# Patient Record
Sex: FEMALE | Race: BLACK OR AFRICAN AMERICAN | Employment: FULL TIME | URBAN - METROPOLITAN AREA
[De-identification: names, ages, dates, MRNs, and addresses within clinical notes are randomized per-mention and may not be internally consistent; named-entity substitution may affect disease eponyms.]

---

## 2017-02-08 ENCOUNTER — OFFICE VISIT (OUTPATIENT)
Dept: URGENT CARE | Facility: URGENT CARE | Age: 47
End: 2017-02-08
Payer: COMMERCIAL

## 2017-02-08 VITALS
BODY MASS INDEX: 35.55 KG/M2 | OXYGEN SATURATION: 98 % | SYSTOLIC BLOOD PRESSURE: 110 MMHG | DIASTOLIC BLOOD PRESSURE: 78 MMHG | WEIGHT: 227 LBS | TEMPERATURE: 97.7 F | HEART RATE: 78 BPM

## 2017-02-08 DIAGNOSIS — M76.62 ACHILLES TENDINITIS OF LEFT LOWER EXTREMITY: Primary | ICD-10-CM

## 2017-02-08 PROCEDURE — 99213 OFFICE O/P EST LOW 20 MIN: CPT | Performed by: FAMILY MEDICINE

## 2017-02-08 RX ORDER — WARFARIN SODIUM 5 MG/1
TABLET ORAL
COMMUNITY
Start: 2017-02-04

## 2017-02-08 ASSESSMENT — PAIN SCALES - GENERAL: PAINLEVEL: SEVERE PAIN (6)

## 2017-02-08 NOTE — MR AVS SNAPSHOT
After Visit Summary   2/8/2017    Josy Shafer    MRN: [id number]           Patient Information     Date Of Birth          1970        Visit Information        Provider Department      2/8/2017 5:40 PM Pito Conway MD [facility]        Care Instructions      Treating Tendonitis of the Foot  Your healthcare provider's first concern is to reduce your symptoms. Using ice and heat, taking medicines, and limiting activity help control pain and swelling. Follow all of your healthcare provider's instructions. Returning to activity too soon may cause your symptoms to come back.    Ice and heat  Ice helps prevent swelling and reduce pain. Place ice on the painful area for 10 to 15 minutes. Repeat the icing several times a day. If ?you have had the problem for a while, using heat may help. Apply a heating pad or hot towels to the tendon for 20 to 30 minutes 2 or 3 times a day.  Medicines  Your healthcare provider may tell you to take ibuprofen or other anti-inflammatory medicines. These reduce pain and swelling. Take them as directed. Don t wait until you feel pain. In more severe cases, cortisone may be injected to relieve pain.  Limiting activities  Rest allows the tissues in your foot to heal. Stay off your feet for a few days, then slowly work back into activity. If you do high-impact activities, such as running or aerobics, try other activities that place less strain on your foot. Cycling and swimming are good choices.    6558-3634 [employer]. [address] [place] [address]. All rights reserved. This information is not intended as a substitute for professional medical care. Always follow your healthcare professional's instructions.              Follow-ups after your visit        Your next 10 appointments already scheduled     Feb 21, 2017 10:40 AM   New Visit with Kamar Genao OD   [facility]  "   [address] [place] [id number] [phone]              Who to contact     If you have questions or need follow up information about today's clinic visit or your schedule please contact [facility] directly at [phone].  Normal or non-critical lab and imaging results will be communicated to you by MyChart, letter or phone within 4 business days after the clinic has received the results. If you do not hear from us within 7 days, please contact the clinic through MyChart or phone. If you have a critical or abnormal lab result, we will notify you by phone as soon as possible.  Submit refill requests through [employer] or call your pharmacy and they will forward the refill request to us. Please allow 3 business days for your refill to be completed.          Additional Information About Your Visit        MyChart Information     [employer] lets you send messages to your doctor, view your test results, renew your prescriptions, schedule appointments and more. To sign up, go to [email or web address] . Click on \"Log in\" on the left side of the screen, which will take you to the Welcome page. Then click on \"Sign up Now\" on the right side of the page.     You will be asked to enter the access code listed below, as well as some personal information. Please follow the directions to create your username and password.     Your access code is: [email or web address]  Expires: 2017  6:10 PM     Your access code will  in 90 days. If you need help or a new code, please call your [facility] or [phone].        Care EveryWhere ID     This is your Care EveryWhere ID. This could be used by other organizations to access your [facility] medical records  [id number]        Your Vitals Were     Pulse Temperature Pulse Oximetry Breastfeeding?          78 97.7  F (36.5  C) (Oral) 98% No         Blood Pressure from Last 3 Encounters:   17 110/78   16 103/64   14 150/89    " Weight from Last 3 Encounters:   02/08/17 227 lb (102.967 kg)   03/20/14 204 lb (92.534 kg)   05/09/10 195 lb (88.451 kg)              Today, you had the following     No orders found for display         Today's Medication Changes          These changes are accurate as of: 2/8/17  6:10 PM.  If you have any questions, ask your nurse or doctor.               Stop taking these medicines if you haven't already. Please contact your care team if you have questions.     amoxicillin-clavulanate 875-125 MG per tablet   Commonly known as:  AUGMENTIN   Stopped by:  Pito Conway MD           indomethacin 50 MG capsule   Commonly known as:  INDOCIN   Stopped by:  Pito Conway MD           predniSONE 20 MG tablet   Commonly known as:  DELTASONE   Stopped by:  Pito Conway MD           traMADol 50 MG tablet   Commonly known as:  ULTRAM   Stopped by:  Pito Conway MD                    Primary Care Provider    None       No address on file        Thank you!     Thank you for choosing [facility]  for your care. Our goal is always to provide you with excellent care. Hearing back from our patients is one way we can continue to improve our services. Please take a few minutes to complete the written survey that you may receive in the mail after your visit with us. Thank you!             Your Updated Medication List - Protect others around you: Learn how to safely use, store and throw away your medicines at www.disposemymeds.org.          This list is accurate as of: 2/8/17  6:10 PM.  Always use your most recent med list.                   Brand Name Dispense Instructions for use    ALEVE PO      prn ear pain       ibuprofen 800 MG tablet    ADVIL/MOTRIN         NASAL DECONGESTANT SPRAY 0.05 % spray   Generic drug:  oxymetazoline          ondansetron 8 MG tablet    ZOFRAN         SUDOGEST 30 MG tablet   Generic drug:  pseudoePHEDrine          warfarin 5 MG tablet    COUMADIN

## 2017-02-08 NOTE — NURSING NOTE
"Chief Complaint   Patient presents with     Musculoskeletal Problem     1 week ago and gone away and now painful for 2 days with a lump       Initial /78 mmHg  Pulse 78  Temp(Src) 97.7  F (36.5  C) (Oral)  Wt 227 lb (102.967 kg)  SpO2 98%  Breastfeeding? No Estimated body mass index is 35.55 kg/(m^2) as calculated from the following:    Height as of 3/20/14: 5' 7\" (1.702 m).    Weight as of this encounter: 227 lb (102.967 kg).  Medication Reconciliation: complete   Samia Carlos CMA    "

## 2017-02-09 NOTE — PATIENT INSTRUCTIONS
Treating Tendonitis of the Foot  Your healthcare provider's first concern is to reduce your symptoms. Using ice and heat, taking medicines, and limiting activity help control pain and swelling. Follow all of your healthcare provider's instructions. Returning to activity too soon may cause your symptoms to come back.    Ice and heat  Ice helps prevent swelling and reduce pain. Place ice on the painful area for 10 to 15 minutes. Repeat the icing several times a day. If ?you have had the problem for a while, using heat may help. Apply a heating pad or hot towels to the tendon for 20 to 30 minutes 2 or 3 times a day.  Medicines  Your healthcare provider may tell you to take ibuprofen or other anti-inflammatory medicines. These reduce pain and swelling. Take them as directed. Don t wait until you feel pain. In more severe cases, cortisone may be injected to relieve pain.  Limiting activities  Rest allows the tissues in your foot to heal. Stay off your feet for a few days, then slowly work back into activity. If you do high-impact activities, such as running or aerobics, try other activities that place less strain on your foot. Cycling and swimming are good choices.    9585-6585 [employer]. [address] [place] [address], [place] [address]. All rights reserved. This information is not intended as a substitute for professional medical care. Always follow your healthcare professional's instructions.

## 2017-02-09 NOTE — PROGRESS NOTES
SUBJECTIVE:                                                    Josy Shafer is a 46 year old female who presents to clinic today for the following health issues:    Presents with a hx of a couple of days of lump on ankle and tenderness to palpation. Currently on coumadin for hx of postpartum clot in which she was on anticoagulation for a year and then stopped for many years. Followed by a history PE years ago and on coumadin since that time. Denies foot swelling leg swelling, or calf tenderness. Denies cough or tachycardia, shortness of breath or chest pain.     Problem list and histories reviewed & adjusted, as indicated.  Additional history: as documented    There is no problem list on file for this patient.    No past surgical history on file.    Social History   Substance Use Topics     Smoking status: Former Smoker     Smokeless tobacco: Not on file      Comment: quit 13 years ago     Alcohol Use: No     No family history on file.      Current Outpatient Prescriptions   Medication Sig Dispense Refill     warfarin (COUMADIN) 5 MG tablet        ibuprofen (ADVIL,MOTRIN) 800 MG tablet        ondansetron (ZOFRAN) 8 MG tablet        NASAL DECONGESTANT SPRAY 0.05 % nasal spray        SUDOGEST 30 MG tablet        ALEVE OR prn ear pain       No Known Allergies    ROS:  Constitutional, HEENT, cardiovascular, pulmonary, gi and gu systems are negative, except as otherwise noted.    OBJECTIVE:                                                    /78 mmHg  Pulse 78  Temp(Src) 97.7  F (36.5  C) (Oral)  Wt 227 lb (102.967 kg)  SpO2 98%  Breastfeeding? No  Body mass index is 35.55 kg/(m^2).  GENERAL: healthy, alert and no distress  NECK: no adenopathy, no asymmetry, masses, or scars and thyroid normal to palpation  RESP: lungs clear to auscultation - no rales, rhonchi or wheezes  CV: regular rate and rhythm, normal S1 S2, no S3 or S4, no murmur, click or rub, no peripheral edema and peripheral pulses  strong  ABDOMEN: soft, nontender, no hepatosplenomegaly, no masses and bowel sounds normal  MS: no gross musculoskeletal defects noted, no edema         ASSESSMENT/PLAN:                                                        ICD-10-CM    1. Achilles tendinitis of left lower extremity M76.62          PLAN  Discussed ice, rest and elevation. Reduction of painful activity.   The patient indicates understanding of these issues and agrees with the plan.  Pito Conway MD        [facility]

## 2022-01-19 ENCOUNTER — OFFICE VISIT (OUTPATIENT)
Dept: FAMILY MEDICINE | Facility: CLINIC | Age: 52
End: 2022-01-19
Payer: COMMERCIAL

## 2022-01-19 VITALS — HEART RATE: 79 BPM | DIASTOLIC BLOOD PRESSURE: 73 MMHG | SYSTOLIC BLOOD PRESSURE: 112 MMHG | TEMPERATURE: 98.6 F

## 2022-01-19 DIAGNOSIS — L21.9 SEBORRHEIC DERMATITIS: Primary | ICD-10-CM

## 2022-01-19 PROCEDURE — 99204 OFFICE O/P NEW MOD 45 MIN: CPT | Performed by: PHYSICIAN ASSISTANT

## 2022-01-19 RX ORDER — KETOCONAZOLE 20 MG/G
CREAM TOPICAL DAILY
Qty: 30 G | Refills: 0 | Status: SHIPPED | OUTPATIENT
Start: 2022-01-19 | End: 2022-02-02

## 2022-01-19 RX ORDER — BENZOCAINE/MENTHOL 6 MG-10 MG
LOZENGE MUCOUS MEMBRANE 2 TIMES DAILY
Qty: 30 G | Refills: 0 | Status: SHIPPED | OUTPATIENT
Start: 2022-01-19 | End: 2022-02-02

## 2022-01-19 NOTE — PROGRESS NOTES
Patient presents with:  Derm Problem: rash itching and painful x 6 months       Clinical Decision Making:  I had a conversation with patient stating I do think that she has problems with seborrheic dermatitis.  Patient is treated with topical antifungal and a low potency hydrocortisone to be applied to the face.  Patient was counseled to have short course of the topical steroids on the face to avoid complications. Expected course of resolution and indication for return was gone over and questions were answered to patient/parent's satisfaction before discharge.        ICD-10-CM    1. Seborrheic dermatitis  L21.9 ketoconazole (NIZORAL) 2 % external cream     hydrocortisone (CORTAID) 1 % external cream       Patient Instructions   Apply topical creams to the face and the affected areas.  Wash the area once daily keep clean and dry and you may thoroughly dry the skin with a hair dryer on cool setting  Follow-up with your primary care provider if not getting good resolution of new symptoms or concerns arise      Patient Education     Understanding Seborrheic Dermatitis    Seborrheic dermatitis is a common type of rash that causes red, scaly, greasy skin. It occurs on skin that has oil glands. These include the face, upper chest, and scalp, where it is often called dandruff. It tends to last a long time, or go away and come back. Seborrheic dermatitis is not spread from person to person.    How to say it  [id number]  What causes seborrheic dermatitis?  Experts don't know what causes it. It may be partly caused by a type of yeast that grows on skin, along with extra oil production. Experts are still learning more. It s more common in men than women, and it can occur at any age. It happens more often in people with HIV/AIDS, Parkinson disease, alcoholic pancreatitis, hepatitis, or cancer. It can also get worse during times of stress.   Symptoms of seborrheic dermatitis  Symptoms can include skin that  is:    Bumpy    Covered with yellow scales or crusts    Cracked    Greasy    Itchy    Leaking fluid    Painful    Red or orange  These symptoms can occur on skin:    Around the nose    Behind the ears    In the beard    In the eyebrows    On the scalp, also known as dandruff    On the upper chest  You may also have acne, inflamed eyelids (blepharitis), or other skin conditions at the same time.   Treatment for seborrheic dermatitis  Treatment can reduce symptoms for a period of time. The types of treatments most often used include:     Antifungal shampoo, body wash, or cream. These contain medicines such as ketoconazole, fluconazole, selenium sulfide, ciclopirox, pyrithione zinc, or tea tree oil.    Corticosteroid cream or ointment. These contain medicines such as hydrocortisone.    Calcineurin inhibitor cream or ointment. These contain medicines such as pimecrolimus or tacrolimus.    Shampoo or cream with other medicines. These contain medicines such as coal tar, salicylic acid, or zinc pyrithione.    Sodium sulfacetemide creams and washes. These may also help.  In some cases one treatment will stop working after a while. Switching between treatments every few months may be helpful.   Wash your skin gently. You can remove scales with oil and gentle rubbing or a brush.  Living with seborrheic dermatitis  Seborrheic dermatitis is an ongoing (chronic) condition. It can go away and then come back. You will likely need to use shampoo, cream, or ointment with medicine once or twice a week. This can help to keep symptoms from coming back or getting worse.   When to call your healthcare provider  Call your healthcare provider right away if you have any of these:    Symptoms that don t get better, or get worse    New symptoms  Andreas last reviewed this educational content on 11/1/2019 2000-2021 [employer]. All rights reserved. This information is not intended as a substitute for professional medical  care. Always follow your healthcare professional's instructions.               HPI:  Josy Shafer is a 51 year old female who presents today for a rash that is persistent on the face.  Patient shares that she has had this in the past and has been itching burning and has distribution on the face over the forehead over the eyelids the nose the malar portion of the maxillary facial bones and on the chin and cheeks.  Patient does state that it is painful itching and burning at times.  She has had good improvement with this in the past with topical steroids as well as when she was hospitalized for COVID in November she had internal oral steroid that also cleared up the rash for her.     History obtained from chart review and the patient.    Problem List:  There are no relevant problems documented for this patient.      Past Medical History:   Diagnosis Date     CVA (cerebral infarction) 2000     PE (pulmonary embolism) 2000       Social History     Tobacco Use     Smoking status: Former Smoker     Smokeless tobacco: Not on file     Tobacco comment: quit 13 years ago   Substance Use Topics     Alcohol use: No       Review of Systems  As above in HPI otherwise negative.    Vitals:    01/19/22 1321   BP: 112/73   Pulse: 79   Temp: 98.6  F (37  C)     General: Patient is resting comfortably no acute distress is afebrile  HEENT: Head is normocephalic atraumatic   Scalp is without involvement of rash.  eyes are PERRL EOMI sclera anicteric   TMs are clear bilaterally external auditory canal and external pinna are not involved and there is no rash noted.  Throat is clear  No cervical lymphadenopathy present  Skin: With red maculopapular and pustular rash on the face with distribution over the glabella forehead eyelids under the eyes and on the maxillary facial area chin and nasolabial folds.  Rash appears to be consistent with seborrheic dermatitis      Physical Exam      At the end of the encounter, I discussed results,  diagnosis, medications. Discussed red flags for immediate return to clinic/ER, as well as indications for follow up if no improvement. Patient understood and agreed to plan. Patient was stable for discharge.